# Patient Record
Sex: MALE | Race: WHITE | Employment: UNEMPLOYED | URBAN - NONMETROPOLITAN AREA
[De-identification: names, ages, dates, MRNs, and addresses within clinical notes are randomized per-mention and may not be internally consistent; named-entity substitution may affect disease eponyms.]

---

## 2019-01-17 ENCOUNTER — OFFICE VISIT (OUTPATIENT)
Dept: FAMILY MEDICINE | Facility: CLINIC | Age: 49
End: 2019-01-17
Payer: COMMERCIAL

## 2019-01-17 VITALS
WEIGHT: 202 LBS | BODY MASS INDEX: 32.47 KG/M2 | HEIGHT: 66 IN | SYSTOLIC BLOOD PRESSURE: 124 MMHG | TEMPERATURE: 99.4 F | OXYGEN SATURATION: 99 % | DIASTOLIC BLOOD PRESSURE: 72 MMHG | HEART RATE: 84 BPM

## 2019-01-17 DIAGNOSIS — J02.9 SORE THROAT: Primary | ICD-10-CM

## 2019-01-17 DIAGNOSIS — G44.209 TENSION HEADACHE: ICD-10-CM

## 2019-01-17 DIAGNOSIS — B37.0 ORAL THRUSH: ICD-10-CM

## 2019-01-17 LAB
DEPRECATED S PYO AG THROAT QL EIA: NORMAL
SPECIMEN SOURCE: NORMAL

## 2019-01-17 PROCEDURE — 87081 CULTURE SCREEN ONLY: CPT | Performed by: NURSE PRACTITIONER

## 2019-01-17 PROCEDURE — 99204 OFFICE O/P NEW MOD 45 MIN: CPT | Performed by: NURSE PRACTITIONER

## 2019-01-17 PROCEDURE — 87880 STREP A ASSAY W/OPTIC: CPT | Performed by: NURSE PRACTITIONER

## 2019-01-17 RX ORDER — NYSTATIN 100000/ML
500000 SUSPENSION, ORAL (FINAL DOSE FORM) ORAL 4 TIMES DAILY
Qty: 140 ML | Refills: 0 | Status: SHIPPED | OUTPATIENT
Start: 2019-01-17 | End: 2019-01-24

## 2019-01-17 ASSESSMENT — MIFFLIN-ST. JEOR: SCORE: 1734.02

## 2019-01-17 NOTE — PATIENT INSTRUCTIONS
1. Sore throat  Acute  - Rapid strep screen  Results for orders placed or performed in visit on 01/17/19   Rapid strep screen   Result Value Ref Range    Specimen Description Throat     Rapid Strep A Screen       NEGATIVE: No Group A streptococcal antigen detected by immunoassay, await culture report.     - Beta strep group A culture  - nystatin (MYCOSTATIN) [id number] UNIT/ML suspension; Take 5 mLs (500,000 Units) by mouth 4 times daily for 7 days  Dispense: 140 mL; Refill: 0    2. Tension headache  Acute, stable  Follow the headache guide and keep a journal  May use Excedrin, glass of water, massage, rest, ice, sleep    3. Oral thrush  Acute, stable  - nystatin (MYCOSTATIN) [id number] UNIT/ML suspension; Take 5 mLs (500,000 Units) by mouth 4 times daily for 7 days  Dispense: 140 mL; Refill: 0      Patient Education     Candida Infection: Thrush  Thrush is a fungal infection in the mouth and throat. Thrush does not usually affect healthy adults. It is more common in people with a weak immune system. It is also more likely if you take antibiotics. Thrush is normally not contagious.  Understanding fungus in the mouth and throat  Your mouth and throat normally contain millions of tiny organisms. These include bacteria and yeasts. Many of these do not cause any problems. In fact, they may help fight disease.  Yeasts are a type of fungus. A type of yeast called Candida normally lives on the membranes of your mouth and throat. Usually, this yeast grows only in small amounts and is harmless. But in some cases, Candida can grow out of control and cause thrush. Thrush is related to other kinds of Candida infections that can grow all over the body. Thrush refers to an infection of only the mouth and throat.  What causes thrush?  Thrush happens when something lets too much Candida grow inside your mouth and throat. Certain things that change the normal balance of organisms in the mouth can lead to thrush. One example is antibiotic  medicine. This medicine may kill some of the normal bacteria in your mouth. Candida can then grow freely. People on antibiotics have an increased risk for thrush.  You have a higher risk for thrush if you:    Wear dentures    Are getting chemotherapy    Are getting radiation therapy    Have diabetes    Have a transplanted organ    Use corticosteroids, including inhaled corticosteroids for lung disease    Have a weak immune system, such as from AIDS    Are an older adult  Symptoms of thrush  Symptoms of thrush can include:    A dry, cottony feeling in your mouth    Cracking at the corners of the mouth    Loss of taste    Pain while eating or swallowing    White patches on the tongue and around the sides of the mouth  Diagnosing thrush  Your healthcare provider will ask about your medical history and your symptoms. He or she will look closely at your mouth and throat. White or red patches will be scraped with a tongue depressor. The sample will be sent to a lab to test. This test can usually confirm thrush.  If you have thrush, you may also have esophageal candidiasis. This is common in people who have HIV or a weak immune system. Your healthcare provider may check for this condition with an upper endoscopy. This is a procedure to look at the esophagus. A tissue sample may be taken to test.  Treatment for thrush  Thrush is usually treated with antifungal medicine. The medicine is put directly in your mouth and throat. You may be given a  swish and swallow  medicine or an antifungal lozenge.  In some cases, you may need an antifungal pill. This can remove Candida throughout your body. Or you may need medicine through an intravenous line ( IV). These treatments depend on how severe your infection is, and what other health conditions you have.  If you are at high risk for thrush, you may need to keep taking oral antifungal medicine. This is to help prevent thrush in the future.  What happens if you don t get treated for  thrush?  If untreated, the Candida may spread throughout your body. They may even enter your bloodstream. This can cause serious problems, such as organ failure and even death. Bloodstream infection may need to be treated with high doses of antifungal medicine through an IV.  Systemic infection is much more likely in people who are very ill. It is also more common in those who have serious problems with their immune system. Additional risk factors for systemic infection in very ill people include:    Central venous lines    IV nutrition    Use of broad-spectrum antibiotics    Kidney failure    Recent surgery  Preventing thrush  You may be able to help prevent some cases of thrush. Make sure to:    Practice good oral hygiene. Try using a chlorhexidine mouthwash.    Clean your dentures regularly as instructed. Make sure they fit you correctly.    After using a corticosteroid inhaler, rinse out your mouth with water or mouthwash.    Do not use broad-spectrum antibiotics, if possible.    Get treated for health problems that increase your risk for thrush, such as diabetes.     When to call the healthcare provider  Call your healthcare provider right away if you have any of these:    Cottony feeling in your mouth    Loss of taste    Pain while eating or swallowing    White patches or plaques on your tongue or inside your mouth   Date Last Reviewed: 5/1/2017 2000-2018 [employer]. [address] [place] [address], [place] [address]. All rights reserved. This information is not intended as a substitute for professional medical care. Always follow your healthcare professional's instructions.

## 2019-01-17 NOTE — NURSING NOTE
"Chief Complaint   Patient presents with     Pharyngitis       Initial /72 (BP Location: Right arm, Patient Position: Sitting, Cuff Size: Adult Large)   Pulse 84   Temp 99.4  F (37.4  C) (Tympanic)   Ht 1.676 m (5' 6\")   Wt 91.6 kg (202 lb)   SpO2 99%   BMI 32.60 kg/m   Estimated body mass index is 32.6 kg/m  as calculated from the following:    Height as of this encounter: 1.676 m (5' 6\").    Weight as of this encounter: 91.6 kg (202 lb).    Patient presents to the clinic using No DME    Health Maintenance that is potentially due pending provider review:  NONE    n/a    Is there anyone who you would like to be able to receive your results? No  If yes have patient fill out GAL[facility]    "

## 2019-01-17 NOTE — PROGRESS NOTES
SUBJECTIVE:   Carmelo Henderson is a 48 year old male who presents to clinic today for the following health issues:    Leonarda- fiancee    ENT Symptoms             Symptoms: cc Present Absent Comment   Fever/Chills  x  100.4 to 101.8   Fatigue  x     Muscle Aches  x     Eye Irritation   x    Sneezing   x    Nasal Jere/Drg   x    Sinus Pressure/Pain   x    Loss of smell   x    Dental pain   x    Sore Throat  x     Swollen Glands  x     Ear Pain/Fullness   x    Cough   x    Wheeze   x    Chest Pain   x    Shortness of breath   x    Rash   x    Other         Symptom duration:  2 days    Symptom severity:  Severe when swallowing    Treatments tried:  Tylenol    Contacts:  Exposed to thrush    Fiancee treated for thrush 2 weeks ago    Headaches    Duration: Years,increasing for 3 months      Description  Location:  unilateral in the left occipital area (start on the back and then come to the top  Glasses- last eye exam 2012  Character: squeezing pain  Frequency:  1-2 times weekly  Duration:  8-10 hours    Intensity:  moderate    Accompanying signs and symptoms:    Precipitating or Alleviating factors:  Nausea/vomiting: the last 3 days   Dizziness: no  Weakness or numbness: no  Visual changes: vision blacks out for 1 second  Fever: YES- the past 3 days   Sinus or URI symptoms no     History  Head trauma: YES- once fell on the ice unsure if he lost consciousness    Family history of migraines: no  Previous tests for headaches: no  Neurologist evaluations: no  Able to do daily activities when headache present: YES- usually   Wake with headaches: YES  Daily pain medication use: no  Any changes in: unemployed during the winter     Precipitating or Alleviating factors (light/sound/sleep/caffeine): no    Therapies tried and outcome: Tylenol or IBU   Outcome - not effective  Frequent/daily pain medication use: no      PCP   [facility] [phone]    Health Maintenance        Health Maintenance Due   Topic Date Due  "    PHQ-2 Q1 YR  08/28/1983     HIV SCREEN (SYSTEM ASSIGNED)  08/28/1989     DTAP/TDAP/TD IMMUNIZATION (1 - Tdap) 08/28/1996     LIPID SCREEN Q5 YR MALE (SYSTEM ASSIGNED)  08/28/2006     INFLUENZA VACCINE (1) 09/01/2018       HPI      There is no problem list on file for this patient.    No current outpatient medications on file.     No current facility-administered medications for this visit.        Reviewed and updated:  Tobacco  Allergies  Meds  Med Hx  Surg Hx  Fam Hx  Soc Hx     ROS:  Constitutional, HEENT, cardiovascular, pulmonary, derm, M/S, Psych, GI and  systems are negative, except as otherwise noted.    PHYSICAL EXAM   /72 (BP Location: Right arm, Patient Position: Sitting, Cuff Size: Adult Large)   Pulse 84   Temp 99.4  F (37.4  C) (Tympanic)   Ht 1.676 m (5' 6\")   Wt 91.6 kg (202 lb)   SpO2 99%   BMI 32.60 kg/m    Body mass index is 32.6 kg/m .  GENERAL APPEARANCE: healthy, alert and no distress  EYES: Eyes grossly normal to inspection, PERRL, conjunctivae and sclerae normal and GLASSES  HENT: ear canals and TM's normal and nose and mouth without ulcers or lesions, posterior oropharynx is erythemic with yellow patches  NECK: no adenopathy, no asymmetry, masses, or scars and thyroid normal to palpation  RESP: lungs clear to auscultation - no rales, rhonchi or wheezes  CV: regular rates and rhythm, normal S1 S2, no S3 or S4 and no murmur, click or rub  MS: extremities normal- no gross deformities noted  PSYCH: mentation appears normal and affect normal/bright      ASSESSMENT & PLAN   Time spent: 30 minutes  with patient; greater than one half devoted to coordination of care for diagnosis and plan above.     1. Sore throat  Acute  - Rapid strep screen  Results for orders placed or performed in visit on 01/17/19   Rapid strep screen   Result Value Ref Range    Specimen Description Throat     Rapid Strep A Screen       NEGATIVE: No Group A streptococcal antigen detected by immunoassay, " await culture report.     - Beta strep group A culture  - nystatin (MYCOSTATIN) [id number] UNIT/ML suspension; Take 5 mLs (500,000 Units) by mouth 4 times daily for 7 days  Dispense: 140 mL; Refill: 0    2. Tension headache  Acute, stable  Follow the headache guide and keep a journal  May use Excedrin, glass of water, massage, rest, ice, sleep    3. Oral thrush  Acute, stable  - nystatin (MYCOSTATIN) [id number] UNIT/ML suspension; Take 5 mLs (500,000 Units) by mouth 4 times daily for 7 days  Dispense: 140 mL; Refill: 0      Patient Education     Candida Infection: Thrush  Thrush is a fungal infection in the mouth and throat. Thrush does not usually affect healthy adults. It is more common in people with a weak immune system. It is also more likely if you take antibiotics. Thrush is normally not contagious.  Understanding fungus in the mouth and throat  Your mouth and throat normally contain millions of tiny organisms. These include bacteria and yeasts. Many of these do not cause any problems. In fact, they may help fight disease.  Yeasts are a type of fungus. A type of yeast called Candida normally lives on the membranes of your mouth and throat. Usually, this yeast grows only in small amounts and is harmless. But in some cases, Candida can grow out of control and cause thrush. Thrush is related to other kinds of Candida infections that can grow all over the body. Thrush refers to an infection of only the mouth and throat.  What causes thrush?  Thrush happens when something lets too much Candida grow inside your mouth and throat. Certain things that change the normal balance of organisms in the mouth can lead to thrush. One example is antibiotic medicine. This medicine may kill some of the normal bacteria in your mouth. Candida can then grow freely. People on antibiotics have an increased risk for thrush.  You have a higher risk for thrush if you:    Wear dentures    Are getting chemotherapy    Are getting radiation  therapy    Have diabetes    Have a transplanted organ    Use corticosteroids, including inhaled corticosteroids for lung disease    Have a weak immune system, such as from AIDS    Are an older adult  Symptoms of thrush  Symptoms of thrush can include:    A dry, cottony feeling in your mouth    Cracking at the corners of the mouth    Loss of taste    Pain while eating or swallowing    White patches on the tongue and around the sides of the mouth  Diagnosing thrush  Your healthcare provider will ask about your medical history and your symptoms. He or she will look closely at your mouth and throat. White or red patches will be scraped with a tongue depressor. The sample will be sent to a lab to test. This test can usually confirm thrush.  If you have thrush, you may also have esophageal candidiasis. This is common in people who have HIV or a weak immune system. Your healthcare provider may check for this condition with an upper endoscopy. This is a procedure to look at the esophagus. A tissue sample may be taken to test.  Treatment for thrush  Thrush is usually treated with antifungal medicine. The medicine is put directly in your mouth and throat. You may be given a  swish and swallow  medicine or an antifungal lozenge.  In some cases, you may need an antifungal pill. This can remove Candida throughout your body. Or you may need medicine through an intravenous line ( IV). These treatments depend on how severe your infection is, and what other health conditions you have.  If you are at high risk for thrush, you may need to keep taking oral antifungal medicine. This is to help prevent thrush in the future.  What happens if you don t get treated for thrush?  If untreated, the Candida may spread throughout your body. They may even enter your bloodstream. This can cause serious problems, such as organ failure and even death. Bloodstream infection may need to be treated with high doses of antifungal medicine through an  IV.  Systemic infection is much more likely in people who are very ill. It is also more common in those who have serious problems with their immune system. Additional risk factors for systemic infection in very ill people include:    Central venous lines    IV nutrition    Use of broad-spectrum antibiotics    Kidney failure    Recent surgery  Preventing thrush  You may be able to help prevent some cases of thrush. Make sure to:    Practice good oral hygiene. Try using a chlorhexidine mouthwash.    Clean your dentures regularly as instructed. Make sure they fit you correctly.    After using a corticosteroid inhaler, rinse out your mouth with water or mouthwash.    Do not use broad-spectrum antibiotics, if possible.    Get treated for health problems that increase your risk for thrush, such as diabetes.     When to call the healthcare provider  Call your healthcare provider right away if you have any of these:    Cottony feeling in your mouth    Loss of taste    Pain while eating or swallowing    White patches or plaques on your tongue or inside your mouth   Date Last Reviewed: 5/1/2017 2000-2018 [employer]. [address] [place] [address]. All rights reserved. This information is not intended as a substitute for professional medical care. Always follow your healthcare professional's instructions.             Risks, benefits, side effects and rationale for treatment plan fully discussed with the patient and understanding expressed.    REYNA Gant-BC  [facility]

## 2019-01-18 LAB
BACTERIA SPEC CULT: NORMAL
SPECIMEN SOURCE: NORMAL

## 2019-01-18 NOTE — RESULT ENCOUNTER NOTE
Final Beta strep group A r/o culture is NEGATIVE for Group A streptococcus.    No treatment or change in treatment per [facility] Strep protocol.    Please notify him of these results and send a hard copy if not on myChart.   Thanks. REYNA Fernandez

## 2020-03-27 ENCOUNTER — TELEPHONE (OUTPATIENT)
Dept: FAMILY MEDICINE | Facility: CLINIC | Age: 50
End: 2020-03-27

## 2020-03-27 PROBLEM — Z72.0 TOBACCO ABUSE DISORDER: Status: ACTIVE | Noted: 2020-03-27

## 2020-03-27 NOTE — TELEPHONE ENCOUNTER
Reason for Call:  Other call back    Detailed comments: Cindy Joe is calling for Carmelo and roxanna Booker know as requested that for smoking cessation for him he  would like the presription of chanix.   Phar is [facility] in [place]    Phone Number Patient can be reached at: Other phone number:  [phone]    Best Time: any    Can we leave a detailed message on this number? YES    Call taken on 3/27/2020 at 1:56 PM by Terra Castro

## 2020-03-28 NOTE — PROGRESS NOTES
"SUBJECTIVE   Carmelo Henderson is a 49 year old male who is being evaluated via a  billable telephone visit with complaint of  Smoking Cessation      The patient has been notified of following:   \"This telephone visit will be conducted via a call between you and your physician/provider. We have found that certain health care needs can be provided without the need for a physical exam.  This service lets us provide the care you need with a short phone conversation.  If a prescription is necessary we can send it directly to your pharmacy.  If lab work is needed we can place an order for that and you can then stop by our lab to have the test done at a later time.    If during the course of the call the physician/provider feels a telephone visit is not appropriate, you will not be charged for this service.\"   Heidy Booker CNP on 4/3/2020 at 10:36 AM   (MA signature)    Why do you want to quit? (health/social/financial): health and financial   How much do you smoke?: half a pack or 3/4 pack   How many years have you smoked?: 20   Have you ever tried quitting before, and how? (patch, gum, ecigarette, pills): tried quitting cold turkey and did not work for him   Where do you typically smoke? (car, bar, home, outside): outside, car   When is your first morning cigarette?: 20-30 minutes after waking up and getting coffee  Household smokers # ? 1, his wife   History of seizures? no   Psychiatric drugs? : no   Symptoms of depression: no   Employment: not at the moment   What potential triggers might impede your goal? (friends, daily activity): doesn't drink often but that makes him smoke more, its more of a routine     Nicotine inhaler first choice, gum second choice        PCP   [facility] [phone]    Patient Active Problem List   Diagnosis     Nicotine dependence, uncomplicated, unspecified nicotine product type     Current Outpatient Medications   Medication     nicotine (NICOTROL) 10 MG inhaler "     [START ON 5/3/2020] varenicline (CHANTIX CONTINUING MONTH WALLACE) 1 MG tablet     varenicline (CHANTIX STARTING MONTH WALLACE) 0.5 MG X 11 & 1 MG X 42 tablet     No current facility-administered medications for this visit.        ALLERGIES:  Patient has no known allergies.    Reviewed and updated as needed this visit by Provider:  Tobacco  Allergies  Meds  Med Hx  Surg Hx  Fam Hx  Soc Hx     ASSESSMENT & PLAN     1. Encounter for smoking cessation counseling  - SMOKING CESSATION COUNSELING 3-10 MIN  - varenicline (CHANTIX STARTING MONTH WALLACE) 0.5 MG X 11 & 1 MG X 42 tablet; Continue therapy for 3 months.  Dispense: 53 tablet; Refill: 0  - varenicline (CHANTIX CONTINUING MONTH WALLACE) 1 MG tablet; Take 1 tablet (1 mg) by mouth 2 times daily  Dispense: 60 tablet; Refill: 1  - nicotine (NICOTROL) 10 MG inhaler; Use 1 cartridge as needed for urge to smoke by puffing over course of 20min.  Use 6-16 cart/day; reduce number of cart/day over 6-12 weeks.  Dispense: 180 Box; Refill: 1    2. Nicotine dependence, uncomplicated, unspecified nicotine product type  Chronic, stable  - SMOKING CESSATION COUNSELING 3-10 MIN  - varenicline (CHANTIX STARTING MONTH WALLACE) 0.5 MG X 11 & 1 MG X 42 tablet; Continue therapy for 3 months.  Dispense: 53 tablet; Refill: 0  - varenicline (CHANTIX CONTINUING MONTH WALLACE) 1 MG tablet; Take 1 tablet (1 mg) by mouth 2 times daily  Dispense: 60 tablet; Refill: 1  - nicotine (NICOTROL) 10 MG inhaler; Use 1 cartridge as needed for urge to smoke by puffing over course of 20min.  Use 6-16 cart/day; reduce number of cart/day over 6-12 weeks.  Dispense: 180 Box; Refill: 1      Varenicline Tartrate (Chantix):  Chantix tablets Days                Dosage  0.5 mg                     1 through 3 0.5 mg once daily  0.5 mg                      4 through 7 0.5 mg twice daily  1.0 mg                     Day 8 to end  1 mg twice daily    Renal dosing:  For severe renal impairment, start at a dose of 0.5 mg daily and  "then titrate as needed to a maximum dose of 0.5 mg twice daily.  For patients with end-stage renal disease (ESRD) undergoing hemodialysis, a maximum dose of 0.5 mg daily may be administered if tolerated well.    How to take:  You will start taking Chantix one week before your quit date while you are still smoking. At first you will use the Chantix starter pack. Once you are finished with that, you will be on a \"maintenance dose\" that will probably stay the same for the rest of your treatment.  After a week of slowly increasing your dose, you will take Chantix twice a day. Take each dose after eating and with a full glass of water. Taking Chantix with food and water decreases nausea. That is also why you begin on a lower dose and slowly increase it. Once you begin the twice a day dosage, your 2 doses should be at least 8-10 hours apart and at least 4-5 hours before bedtime.      Duration of therapy: 12 weeks.  An additional course of 12 weeks of treatment is recommended if the first 12 weeks were successful to improve long term abstinence.    Adverse reactions:  The most common adverse reaction associated with varenicline treatment is nausea. Other common adverse effects include sleep disturbance, constipation, flatulence and vomiting.    There have been warnings from the FDA to be on the lookout for erratic behavior, suicidal ideation, or suicidal behavior.   There is one case report of a death, though it appears alcohol consumption may have played a part in that case.    Please report any behavior or mood changes as well as being aware of drowsiness.   Be cautious when operating motor vehicles or dangerous machinery until the medication's effect on you is clear.    Nicotine Inhaler    Dosing:    Weeks 1-12 Use 6-16 cartridges per day. Maximum of 16 cartridges per day.   Weeks 12-24 Gradually reduce the number of cartridges per day.     How to use the nicotine Inhaler:    Remove the mouthpiece from the plastic wrap " "and push the top and bottom pieces together. Turn them to line up the markings and pull the top and bottom apart.     Insert one cartridge into the inhaler. Push hard until it pops into place.    Line up the markings again and push the top and bottom back together.    Turn the pieces so the markings do not line up and the inhaler is \"locked\".    Inhale deeply into back of throat or puff in short breaths.    You will soon learn to be able to tell when no nicotine is left in the cartridge. Once you feel you are no longer getting any nicotine, change the cartridge.    Take off the top of the mouthpiece and throw away the cartridge.    Especially at the beginning, use the inhaler whenever you would normally smoke a cigarette.    Some tips:    Do not smoke while you are using the nicotine inhaler.    Each cartridge will last for 20 minutes of puffing.    Puff on the inhaler until your craving resolves. If used for less than 20 minutes, leave the cartridge in the inhaler and save for your next use.     Try different schedules to see what works best for you--try puffing for a full 20 minutes, or try puffing for 5 minutes at a time. The cartridge will last for FOUR 5 minute sessions.    Keep inhaler and cartridges out of reach of children.    Store the inhaler at room temperature.    Clean the mouthpiece regularly with soap and water.     As your body becomes less dependent on nicotine, you will need to use less cartridges.    Follow up with your health care professional about any questions you have and to get help with tapering you dose of the nicotine inhaler.    Side Effects:  Some people experience mild irritation of the mouth and throat in the first few days. These should resolve with time. If you experience any other troublesome or unusual side effects, call your health care professional.    Kicking the Smoking Habit  If you smoke, quitting is one of the best changes you can make for your heart and your overall " health. Your risk of heart attack goes down within one day of putting out that last cigarette. As you go longer without smoking, your risk goes down even more. Quitting isn t easy, but millions of people have done it. You can, too. It s never too late to quit.    Getting started  Boost your chances of success by deciding on your  quit plan.  Your health care provider and cardiac rehab team can help you develop this plan. Even if you ve already quit, it s easy to slip back into smoking.  Your plan can help you avoid and recover from relapse.  In any case, start by setting a date to quit within a month, and do it.    Keys to your quit plan    Talk to your healthcare provider about prescription medicines and nicotine replacement products that help stop the urge to smoke.     Join a support group or quit smoking program. Talking with others about the challenges of quitting can help you get through them.    Ask other smokers in your household to quit with you.    Look for the cues in your life that you associate with smoking and avoid them.    Track your triggers  What gives you that  N-lrut-u-cigarette  feeling? List all the situations that make you want a cigarette. Then think of other ways to deal with these situations. Here are some examples:  Situation How I'll handle it   Finishing a meal Get up from the table and take a walk   Having an argument Find a quiet place and breathe deeply   Feeling lonely or bored Call a friend to talk      Tips for quitting successfully    List the benefits of quitting such as reducing heart risks and saving money. Keep this list and review it whenever you feel like smoking.    Get support. Let your friends know you may call them to chat when you have an urge to smoke.    If you ve tried to quit before without success, this time avoid the triggers that may cause the relapse.    Make the most of slip-ups. Try to learn from them, and then get back on track.    Be accountable to your  friends and your calendar so that you stay on track.  For family and friends    Be supportive and patient. Quitting smoking can be difficult and stressful.    If you smoke, now s a great time to quit. Even if you don t quit, never smoke around your loved one. Secondhand smoke is dangerous to his or her heart.    The best goals are accomplished in teams. Remember that when your loved one states he or she wants to stop smoking.  Date Last Reviewed: 7/1/2016 2000-2019 [employer]. [address] [place] [address], [place] [address]. All rights reserved. This information is not intended as a substitute for professional medical care. Always follow your healthcare professional's instructions.         How to Quit Smoking  Smoking is a hard habit to break. About half of all people who have ever smoked have been able to quit. Most people who still smoke want to quit. Here are some of the best ways to stop smoking.    Keep in mind the health benefits of quitting  The health benefits of quitting start right away. They keep improving the longer you go without smoking. Knowing this can help inspire you to stay on track. These benefits occur at any age. If you are 17 or 70, quitting is a good choice. Some of the health benefits after your last cigarette include:    20 minutes: Your blood pressure and pulse return to normal.    8 hours: Your oxygen levels return to normal.    2 days: Your ability to smell and taste start to improve as damaged nerves regrow.    2 to 3 weeks: Your circulation and lung function improve.    1 to 9 months: Your coughing, congestion, and shortness of breath decrease. Your tiredness decreases.    1 year: Your risk of heart attack decreases by half.    5 years: Your risk of lung cancer decreases by half. Your risk of stroke becomes the same as a nonsmoker s.  Go cold turkey  Most former smokers quit cold turkey. This means stopping all at once. Trying to cut back slowly often doesn't work as well.  "This may be because it continues the habit of smoking. Also, you may inhale more smoke while smoking fewer cigarettes. This leads to the same amount of nicotine in your body.  Get support  Support programs can be a big help, especially for heavy smokers. These groups offer lectures, ways to change behavior, and peer support. Here are some ways to find a support program:    Free national quitline 800-QUIT-NOW (959-377-6656)    [facility] quit-smoking programs    American Lung Association 927-119-5035    American Cancer Society [phone]  Support at home is important too. Family and friends can offer praise and reassurance. If the smoker in your life finds it hard to quit, encourage them to keep trying.  Try over-the-counter medicine  Nicotine replacement therapy may make it easier to quit. Some aids are available without a prescription. These include a nicotine patch, gum, and lozenges. But it is best to use these under the care of your healthcare provider. The skin patch gives a steady supply of nicotine. Nicotine gum and lozenges give short-time doses of low levels of nicotine. Both methods reduce the craving for cigarettes. If you have nausea, vomiting, dizziness, weakness, or a fast heartbeat, stop using these products. See your healthcare provider.  Ask about prescription medicine  After reviewing your smoking patterns and past attempts to quit, your doctor may offer a prescription medicine such as bupropion, varenicline, a nicotine inhaler, or nasal spray. Each has advantages and side effects. Your doctor can review these with you.  Keep trying  Most smokers make many attempts at quitting before they are successful. It s important not to give up.  For more information  For more on how to quit smoking, try these online resources:     Go to Smokefree.gov.    Read \"Clearing the Air\" from the National Cancer [place] at smokefree.gov/sites/default/files/pdf/clearing-the-air-accessible.pdf.  Date Last Reviewed: "     0221-2810 [employer]. [address] [place] [address], [place] [address]. All rights reserved. This information is not intended as a substitute for professional medical care. Always follow your healthcare professional's instructions.         Coping with Smoking Withdrawal  For the first few days after you quit smoking, you may feel cranky, restless, depressed, or low on energy. These are symptoms of withdrawal. Your body needs time to recover from smoking. Your symptoms should lessen within a few days.    Coping with the urge to smoke    Deep-breathe. Breathe in through your nose. Count to 5. Slowly breathe out through your mouth.    Drink water. Try to drink 8 or more 8-ounce glasses of water a day.    Keep your hands busy. Wash your car. Draw. Do a puzzle. Build a [employer].    Delay. The urge to smoke lasts only 3 to 5 minutes.    Keep your mouth busy. Try chewing on fruits or vegetables such as celery, carrots, or apples. Chew sugarless gum or suck on sugar-free hard candy.    Get support  Individual, group, and phone counseling can help keep you on track. Ask your healthcare provider for more information about resources available to you.  Control stress  After you quit, you may feel irritable and stressed. Try taking a warm bath or shower. Listen to music. Go for a walk or to the gym. Try yoga or meditate. Call friends or talk with a professional.  Exercise  Exercise helps your body and mind feel better. There are many ways to be more active. Find something you enjoy doing. See if a friend will join you for a walk or a bike ride.  Sleep better  You may feel tired but have trouble falling asleep. Try to relax before bed. Do a few stretching exercises. Read for a while. Also don't have caffeine for at least a few hours before bedtime.  Get fit, not fat  You may notice an increased appetite. Many people who quit smoking gain a few pounds. To limit weight gain, try to watch what you eat. Cut back on fat  in your diet. Snack on low-calorie foods such as fresh fruits and vegetables. Drink low-calorie liquids, especially water. Regular exercise can also help you stay fit. And remember: Your main goal is to be a nonsmoker. Stay focused on that goal.  Quit-smoking products  There are many products that can help you quit smoking. These include medicines and nicotine replacement products. They are available over-the-counter or by prescription. Ask your healthcare provider if any of these could help you quit smoking.      Date Last Reviewed: 4/1/2019 2000-2019 [employer]. [address] [place] [address]. All rights reserved. This information is not intended as a substitute for professional medical care. Always follow your healthcare professional's instructions.    Preventing weight gain after you stop smoking    Smoking acts as an appetite suppressant, so gaining weight is a common concern for many of us when we decide to give up cigarettes. You may even be using it as a reason not to quit. While it s true that many smokers put on weight within six months of stopping smoking, the gain is usually small--about five pounds on average--and that initial gain decreases over time. It s also important to remember that carrying a few extra pounds for a few months won t hurt your heart as much as smoking does. However, gaining weight is NOT inevitable when you stop smoking.    Smoking dampens your sense of smell and taste, so after you quit food will often seem more appealing. You may also gain weight if you replace the oral gratification of smoking with eating unhealthy comfort foods. Therefore, it s important to find other, healthy ways to deal with unpleasant feelings such as stress, anxiety, or boredom rather than mindless, emotional eating.    Nurture yourself. Instead of turning to cigarettes or food when you feel stressed, anxious, or depressed, learn new ways to quickly soothe yourself. Listen to  uplifting music, play with a pet, or sip a cup of hot tea, for example.    Eat healthy, varied meals. Eat plenty of fruit, vegetables, and healthy fats. Avoid sugary food, sodas, fried, and convenience food.    Learn to eat mindfully. Emotional eating tends to be automatic and virtually mindless. It s easy to polish off a tub of ice cream while zoning out in front of the TV or staring at your phone. But by removing distractions when you eat, it s easier to focus on how much you re eating and tune into your body and how you re really feeling. Are you really still hungry or eating for another reason?    Drink lots of water. Drinking at least six to eight 8 oz. glasses will help you feel full and keep you from eating when you re not hungry. Water will also help flush toxins from your body.    Take a walk. Not only will it help you burn calories and keep the weight off, but it will also help alleviate feelings of stress and frustration that accompany smoking withdrawal.    Snack on guilt-free foods. Good choices include sugar-free gum, carrot and celery sticks, or sliced bell peppers or jicama.    Where can I get more information about quitting?      For information and referrals to smoking cessation programs in you area, call:    [place] Tobacco Quit Line - 4-204-370-STOP (1-437.377.8780)    American Cancer Society- 1-591-JCN-2345 ( 1-102.940.2602)    American Lung Association - 1-800-LUNG-[place] (1-475.665.1337)    National Cancer [place] - 2-749-8-CANCER   (1-585.639.3046)  Websites:    Smokefree.gov    www.quit.com    FreedomFromSmoking.org    www.helpguide.org    QUITPLAN: 9-936-752-PLAN (7367),  http://quitplan.quitnet.com/p/quitplan/triage.jtml  Everyone in [place] has access to free telephone helpline support to quit tobacco either through their health plan or through QUITPLAN Services. The QUITPLAN web program is free to everyone regardless of coverage.   The QUITPLAN Helpline, including gum, patches,  lozenges, is free to the uninsured and those without coverage.           Risks, benefits, side effects and rationale for treatment plan fully discussed with the patient and understanding expressed. I have reviewed the note as documented above.  This accurately captures the substance of my conversation with the patient.  REYNA Gant-BC    Total time of call between patient and provider was 10 minutes.

## 2020-04-03 ENCOUNTER — VIRTUAL VISIT (OUTPATIENT)
Dept: FAMILY MEDICINE | Facility: CLINIC | Age: 50
End: 2020-04-03
Payer: COMMERCIAL

## 2020-04-03 DIAGNOSIS — Z71.6 ENCOUNTER FOR SMOKING CESSATION COUNSELING: Primary | ICD-10-CM

## 2020-04-03 DIAGNOSIS — F17.200 NICOTINE DEPENDENCE, UNCOMPLICATED, UNSPECIFIED NICOTINE PRODUCT TYPE: ICD-10-CM

## 2020-04-03 PROBLEM — Z72.0 TOBACCO ABUSE DISORDER: Status: RESOLVED | Noted: 2020-03-27 | Resolved: 2020-04-03

## 2020-04-03 PROCEDURE — 99441 ZZC PHYSICIAN TELEPHONE EVALUATION 5-10 MIN: CPT | Performed by: NURSE PRACTITIONER

## 2020-04-03 RX ORDER — VARENICLINE TARTRATE 1 MG/1
1 TABLET, FILM COATED ORAL 2 TIMES DAILY
Qty: 60 TABLET | Refills: 1 | Status: SHIPPED | OUTPATIENT
Start: 2020-05-03 | End: 2020-09-21

## 2020-04-03 NOTE — PATIENT INSTRUCTIONS
1. Encounter for smoking cessation counseling  - SMOKING CESSATION COUNSELING 3-10 MIN  - varenicline (CHANTIX STARTING MONTH WALLACE) 0.5 MG X 11 & 1 MG X 42 tablet; Continue therapy for 3 months.  Dispense: 53 tablet; Refill: 0  - varenicline (CHANTIX CONTINUING MONTH WALLACE) 1 MG tablet; Take 1 tablet (1 mg) by mouth 2 times daily  Dispense: 60 tablet; Refill: 1  - nicotine (NICOTROL) 10 MG inhaler; Use 1 cartridge as needed for urge to smoke by puffing over course of 20min.  Use 6-16 cart/day; reduce number of cart/day over 6-12 weeks.  Dispense: 180 Box; Refill: 1    2. Nicotine dependence, uncomplicated, unspecified nicotine product type  Chronic, stable  - SMOKING CESSATION COUNSELING 3-10 MIN  - varenicline (CHANTIX STARTING MONTH WALLACE) 0.5 MG X 11 & 1 MG X 42 tablet; Continue therapy for 3 months.  Dispense: 53 tablet; Refill: 0  - varenicline (CHANTIX CONTINUING MONTH WALLACE) 1 MG tablet; Take 1 tablet (1 mg) by mouth 2 times daily  Dispense: 60 tablet; Refill: 1  - nicotine (NICOTROL) 10 MG inhaler; Use 1 cartridge as needed for urge to smoke by puffing over course of 20min.  Use 6-16 cart/day; reduce number of cart/day over 6-12 weeks.  Dispense: 180 Box; Refill: 1      Varenicline Tartrate (Chantix):  Chantix tablets Days                Dosage  0.5 mg                     1 through 3 0.5 mg once daily  0.5 mg                      4 through 7 0.5 mg twice daily  1.0 mg                     Day 8 to end  1 mg twice daily    Renal dosing:  For severe renal impairment, start at a dose of 0.5 mg daily and then titrate as needed to a maximum dose of 0.5 mg twice daily.  For patients with end-stage renal disease (ESRD) undergoing hemodialysis, a maximum dose of 0.5 mg daily may be administered if tolerated well.    How to take:  You will start taking Chantix one week before your quit date while you are still smoking. At first you will use the Chantix starter pack. Once you are finished with that, you will be on a  "\"maintenance dose\" that will probably stay the same for the rest of your treatment.  After a week of slowly increasing your dose, you will take Chantix twice a day. Take each dose after eating and with a full glass of water. Taking Chantix with food and water decreases nausea. That is also why you begin on a lower dose and slowly increase it. Once you begin the twice a day dosage, your 2 doses should be at least 8-10 hours apart and at least 4-5 hours before bedtime.      Duration of therapy: 12 weeks.  An additional course of 12 weeks of treatment is recommended if the first 12 weeks were successful to improve long term abstinence.    Adverse reactions:  The most common adverse reaction associated with varenicline treatment is nausea. Other common adverse effects include sleep disturbance, constipation, flatulence and vomiting.    There have been warnings from the FDA to be on the lookout for erratic behavior, suicidal ideation, or suicidal behavior.   There is one case report of a death, though it appears alcohol consumption may have played a part in that case.    Please report any behavior or mood changes as well as being aware of drowsiness.   Be cautious when operating motor vehicles or dangerous machinery until the medication's effect on you is clear.    Nicotine Inhaler    Dosing:    Weeks 1-12 Use 6-16 cartridges per day. Maximum of 16 cartridges per day.   Weeks 12-24 Gradually reduce the number of cartridges per day.     How to use the nicotine Inhaler:    Remove the mouthpiece from the plastic wrap and push the top and bottom pieces together. Turn them to line up the markings and pull the top and bottom apart.     Insert one cartridge into the inhaler. Push hard until it pops into place.    Line up the markings again and push the top and bottom back together.    Turn the pieces so the markings do not line up and the inhaler is \"locked\".    Inhale deeply into back of throat or puff in short breaths.    You " will soon learn to be able to tell when no nicotine is left in the cartridge. Once you feel you are no longer getting any nicotine, change the cartridge.    Take off the top of the mouthpiece and throw away the cartridge.    Especially at the beginning, use the inhaler whenever you would normally smoke a cigarette.    Some tips:    Do not smoke while you are using the nicotine inhaler.    Each cartridge will last for 20 minutes of puffing.    Puff on the inhaler until your craving resolves. If used for less than 20 minutes, leave the cartridge in the inhaler and save for your next use.     Try different schedules to see what works best for you--try puffing for a full 20 minutes, or try puffing for 5 minutes at a time. The cartridge will last for FOUR 5 minute sessions.    Keep inhaler and cartridges out of reach of children.    Store the inhaler at room temperature.    Clean the mouthpiece regularly with soap and water.     As your body becomes less dependent on nicotine, you will need to use less cartridges.    Follow up with your health care professional about any questions you have and to get help with tapering you dose of the nicotine inhaler.    Side Effects:  Some people experience mild irritation of the mouth and throat in the first few days. These should resolve with time. If you experience any other troublesome or unusual side effects, call your health care professional.    Kicking the Smoking Habit  If you smoke, quitting is one of the best changes you can make for your heart and your overall health. Your risk of heart attack goes down within one day of putting out that last cigarette. As you go longer without smoking, your risk goes down even more. Quitting isn t easy, but millions of people have done it. You can, too. It s never too late to quit.    Getting started  Boost your chances of success by deciding on your  quit plan.  Your health care provider and cardiac rehab team can help you develop this  plan. Even if you ve already quit, it s easy to slip back into smoking.  Your plan can help you avoid and recover from relapse.  In any case, start by setting a date to quit within a month, and do it.    Keys to your quit plan    Talk to your healthcare provider about prescription medicines and nicotine replacement products that help stop the urge to smoke.     Join a support group or quit smoking program. Talking with others about the challenges of quitting can help you get through them.    Ask other smokers in your household to quit with you.    Look for the cues in your life that you associate with smoking and avoid them.    Track your triggers  What gives you that  P-ofiy-j-cigarette  feeling? List all the situations that make you want a cigarette. Then think of other ways to deal with these situations. Here are some examples:  Situation How I'll handle it   Finishing a meal Get up from the table and take a walk   Having an argument Find a quiet place and breathe deeply   Feeling lonely or bored Call a friend to talk      Tips for quitting successfully    List the benefits of quitting such as reducing heart risks and saving money. Keep this list and review it whenever you feel like smoking.    Get support. Let your friends know you may call them to chat when you have an urge to smoke.    If you ve tried to quit before without success, this time avoid the triggers that may cause the relapse.    Make the most of slip-ups. Try to learn from them, and then get back on track.    Be accountable to your friends and your calendar so that you stay on track.  For family and friends    Be supportive and patient. Quitting smoking can be difficult and stressful.    If you smoke, now s a great time to quit. Even if you don t quit, never smoke around your loved one. Secondhand smoke is dangerous to his or her heart.    The best goals are accomplished in teams. Remember that when your loved one states he or she wants to stop  smoking.  Date Last Reviewed: 7/1/2016 2000-2019 The [employer]. [address] [facility], [place] [address]. All rights reserved. This information is not intended as a substitute for professional medical care. Always follow your healthcare professional's instructions.         How to Quit Smoking  Smoking is a hard habit to break. About half of all people who have ever smoked have been able to quit. Most people who still smoke want to quit. Here are some of the best ways to stop smoking.    Keep in mind the health benefits of quitting  The health benefits of quitting start right away. They keep improving the longer you go without smoking. Knowing this can help inspire you to stay on track. These benefits occur at any age. If you are 17 or 70, quitting is a good choice. Some of the health benefits after your last cigarette include:    20 minutes: Your blood pressure and pulse return to normal.    8 hours: Your oxygen levels return to normal.    2 days: Your ability to smell and taste start to improve as damaged nerves regrow.    2 to 3 weeks: Your circulation and lung function improve.    1 to 9 months: Your coughing, congestion, and shortness of breath decrease. Your tiredness decreases.    1 year: Your risk of heart attack decreases by half.    5 years: Your risk of lung cancer decreases by half. Your risk of stroke becomes the same as a nonsmoker s.  Go cold turkey  Most former smokers quit cold turkey. This means stopping all at once. Trying to cut back slowly often doesn't work as well. This may be because it continues the habit of smoking. Also, you may inhale more smoke while smoking fewer cigarettes. This leads to the same amount of nicotine in your body.  Get support  Support programs can be a big help, especially for heavy smokers. These groups offer lectures, ways to change behavior, and peer support. Here are some ways to find a support program:    Free national quitline 800-QUIT-NOW  "(727.565.9452)    [employer] quit-smoking programs    American Lung Association 180-725-3976    American Cancer Society 791-038-5016  Support at home is important too. Family and friends can offer praise and reassurance. If the smoker in your life finds it hard to quit, encourage them to keep trying.  Try over-the-counter medicine  Nicotine replacement therapy may make it easier to quit. Some aids are available without a prescription. These include a nicotine patch, gum, and lozenges. But it is best to use these under the care of your healthcare provider. The skin patch gives a steady supply of nicotine. Nicotine gum and lozenges give short-time doses of low levels of nicotine. Both methods reduce the craving for cigarettes. If you have nausea, vomiting, dizziness, weakness, or a fast heartbeat, stop using these products. See your healthcare provider.  Ask about prescription medicine  After reviewing your smoking patterns and past attempts to quit, your doctor may offer a prescription medicine such as bupropion, varenicline, a nicotine inhaler, or nasal spray. Each has advantages and side effects. Your doctor can review these with you.  Keep trying  Most smokers make many attempts at quitting before they are successful. It s important not to give up.  For more information  For more on how to quit smoking, try these online resources:     Go to Smokefree.gov.    Read \"Clearing the Air\" from the National Cancer [place] at smokefree.gov/sites/default/files/pdf/clearing-the-air-accessible.pdf.  Date Last Reviewed:     2413-6746 The [employer]. [address] [place] [address], [place] [address]. All rights reserved. This information is not intended as a substitute for professional medical care. Always follow your healthcare professional's instructions.         Coping with Smoking Withdrawal  For the first few days after you quit smoking, you may feel cranky, restless, depressed, or low on energy. These are symptoms of " withdrawal. Your body needs time to recover from smoking. Your symptoms should lessen within a few days.    Coping with the urge to smoke    Deep-breathe. Breathe in through your nose. Count to 5. Slowly breathe out through your mouth.    Drink water. Try to drink 8 or more 8-ounce glasses of water a day.    Keep your hands busy. Wash your car. Draw. Do a puzzle. Build a [place].    Delay. The urge to smoke lasts only 3 to 5 minutes.    Keep your mouth busy. Try chewing on fruits or vegetables such as celery, carrots, or apples. Chew sugarless gum or suck on sugar-free hard candy.    Get support  Individual, group, and phone counseling can help keep you on track. Ask your healthcare provider for more information about resources available to you.  Control stress  After you quit, you may feel irritable and stressed. Try taking a warm bath or shower. Listen to music. Go for a walk or to the gym. Try yoga or meditate. Call friends or talk with a professional.  Exercise  Exercise helps your body and mind feel better. There are many ways to be more active. Find something you enjoy doing. See if a friend will join you for a walk or a bike ride.  Sleep better  You may feel tired but have trouble falling asleep. Try to relax before bed. Do a few stretching exercises. Read for a while. Also don't have caffeine for at least a few hours before bedtime.  Get fit, not fat  You may notice an increased appetite. Many people who quit smoking gain a few pounds. To limit weight gain, try to watch what you eat. Cut back on fat in your diet. Snack on low-calorie foods such as fresh fruits and vegetables. Drink low-calorie liquids, especially water. Regular exercise can also help you stay fit. And remember: Your main goal is to be a nonsmoker. Stay focused on that goal.  Quit-smoking products  There are many products that can help you quit smoking. These include medicines and nicotine replacement products. They are available  over-the-counter or by prescription. Ask your healthcare provider if any of these could help you quit smoking.      Date Last Reviewed: 4/1/2019 2000-2019 [employer]. [address] [place] [address], [place] [address]. All rights reserved. This information is not intended as a substitute for professional medical care. Always follow your healthcare professional's instructions.    Preventing weight gain after you stop smoking    Smoking acts as an appetite suppressant, so gaining weight is a common concern for many of us when we decide to give up cigarettes. You may even be using it as a reason not to quit. While it s true that many smokers put on weight within six months of stopping smoking, the gain is usually small--about five pounds on average--and that initial gain decreases over time. It s also important to remember that carrying a few extra pounds for a few months won t hurt your heart as much as smoking does. However, gaining weight is NOT inevitable when you stop smoking.    Smoking dampens your sense of smell and taste, so after you quit food will often seem more appealing. You may also gain weight if you replace the oral gratification of smoking with eating unhealthy comfort foods. Therefore, it s important to find other, healthy ways to deal with unpleasant feelings such as stress, anxiety, or boredom rather than mindless, emotional eating.    Nurture yourself. Instead of turning to cigarettes or food when you feel stressed, anxious, or depressed, learn new ways to quickly soothe yourself. Listen to uplifting music, play with a pet, or sip a cup of hot tea, for example.    Eat healthy, varied meals. Eat plenty of fruit, vegetables, and healthy fats. Avoid sugary food, sodas, fried, and convenience food.    Learn to eat mindfully. Emotional eating tends to be automatic and virtually mindless. It s easy to polish off a tub of ice cream while zoning out in front of the TV or staring at your phone.  But by removing distractions when you eat, it s easier to focus on how much you re eating and tune into your body and how you re really feeling. Are you really still hungry or eating for another reason?    Drink lots of water. Drinking at least six to eight 8 oz. glasses will help you feel full and keep you from eating when you re not hungry. Water will also help flush toxins from your body.    Take a walk. Not only will it help you burn calories and keep the weight off, but it will also help alleviate feelings of stress and frustration that accompany smoking withdrawal.    Snack on guilt-free foods. Good choices include sugar-free gum, carrot and celery sticks, or sliced bell peppers or jicama.    Where can I get more information about quitting?      For information and referrals to smoking cessation programs in you area, call:    [place] Tobacco Quit Line - 6-398-299-STOP (1-893.456.4837)    American Cancer Society- 3-732-OEZ-2345 ( 1-854.205.9995)    American Lung Association - 5-800-LUNG-USA (1-334.933.4510)    National Cancer Equality - 4-801-1-CANCER   (1-882.149.5722)  Websites:    Smokefree.gov    www.quit.com    FreedomFromSmoking.org    www.helpguide.org    QUITPLAN: 4-023-084-PLAN (7526),  http://quitplan.quitnet.com/p/quitplan/triage.jtml  Everyone in [place] has access to free telephone helpline support to quit tobacco either through their health plan or through QUITPLAN Services. The QUITPLAN web program is free to everyone regardless of coverage.   The QUITPLAN Helpline, including gum, patches, lozenges, is free to the uninsured and those without coverage.

## 2020-04-13 ENCOUNTER — TELEPHONE (OUTPATIENT)
Dept: FAMILY MEDICINE | Facility: CLINIC | Age: 50
End: 2020-04-13

## 2020-04-13 DIAGNOSIS — F17.200 NICOTINE DEPENDENCE, UNCOMPLICATED, UNSPECIFIED NICOTINE PRODUCT TYPE: Primary | ICD-10-CM

## 2020-04-13 DIAGNOSIS — Z71.6 ENCOUNTER FOR SMOKING CESSATION COUNSELING: ICD-10-CM

## 2020-04-13 NOTE — TELEPHONE ENCOUNTER
Central Prior Authorization Team   Phone: [phone]      PA Initiation    Medication: nicotrol - INITIATED  Insurance Company: [employer] - Phone [phone] Fax [phone]  Pharmacy Filling the Rx: [facility] [address] [place] [address]  Filling Pharmacy Phone: [phone]  Filling Pharmacy Fax: [phone]  Start Date: 4/13/2020

## 2020-04-13 NOTE — TELEPHONE ENCOUNTER
Prior Authorization Retail Medication Request    Medication/Dose: NICOTROL INHALER  Encounter for smoking cessation counseling [Z71.6]  - Primary        Nicotine dependence, uncomplicated, unspecified nicotine product type [F17.200]            ICD code (if different than what is on RX):     Previously Tried and Failed:  Cold turkey  Rationale:       Insurance Name:  BIN [id number] [facility] mcaidmn [phone]  Insurance ID:  [id number]      Pharmacy Information (if different than what is on RX)  Name:   [facility]  Phone:

## 2020-04-14 NOTE — TELEPHONE ENCOUNTER
Boy, Fariba  Dyad 9 Prior Auth 8 minutes ago (1:08 PM)      PA DENIED   Please close encounter when finished. If provider would like to appeal we will need a detailed letter of medical necessity to start the process.   Thank you!   Fariba      Forwarded to Mark Kirk RN

## 2020-04-14 NOTE — TELEPHONE ENCOUNTER
Central Prior Authorization Team   Phone: [phone]      PRIOR AUTHORIZATION DENIED    Medication: nicotrol - DENIED    Denial Date: 4/14/2020    Denial Rational: Pt must try and fail two preferred drugs:    Appeal Information:

## 2020-04-14 NOTE — TELEPHONE ENCOUNTER
Per his insurance, we can try gum. That was his second choice. Please notify patient gum was sent to pharmacy.   REYNA Fernandez

## 2020-09-21 ENCOUNTER — OFFICE VISIT (OUTPATIENT)
Dept: FAMILY MEDICINE | Facility: CLINIC | Age: 50
End: 2020-09-21
Payer: COMMERCIAL

## 2020-09-21 VITALS
HEART RATE: 64 BPM | HEIGHT: 66 IN | RESPIRATION RATE: 18 BRPM | DIASTOLIC BLOOD PRESSURE: 82 MMHG | BODY MASS INDEX: 34.55 KG/M2 | SYSTOLIC BLOOD PRESSURE: 116 MMHG | TEMPERATURE: 98 F | WEIGHT: 215 LBS

## 2020-09-21 DIAGNOSIS — M25.512 ACUTE PAIN OF LEFT SHOULDER: ICD-10-CM

## 2020-09-21 DIAGNOSIS — H61.22 IMPACTED CERUMEN OF LEFT EAR: Primary | ICD-10-CM

## 2020-09-21 PROCEDURE — 69209 REMOVE IMPACTED EAR WAX UNI: CPT | Mod: LT | Performed by: FAMILY MEDICINE

## 2020-09-21 PROCEDURE — 99213 OFFICE O/P EST LOW 20 MIN: CPT | Mod: 25 | Performed by: FAMILY MEDICINE

## 2020-09-21 ASSESSMENT — MIFFLIN-ST. JEOR: SCORE: 1782.98

## 2020-09-21 NOTE — NURSING NOTE
"Chief Complaint   Patient presents with     Ear Problem     /82 (Cuff Size: Adult Large)   Pulse 64   Temp 98  F (36.7  C) (Tympanic)   Resp 18   Ht 1.676 m (5' 6\")   Wt 97.5 kg (215 lb)   BMI 34.70 kg/m   Estimated body mass index is 34.7 kg/m  as calculated from the following:    Height as of this encounter: 1.676 m (5' 6\").    Weight as of this encounter: 97.5 kg (215 lb).  Patient presents to the clinic using No DME      Health Maintenance that is potentially due pending provider review:    Health Maintenance Due   Topic Date Due     PREVENTIVE CARE VISIT  1970     HIV SCREENING  08/28/1986     PNEUMOCOCCAL IMMUNIZATION 19-64 MEDIUM RISK (1 of 1 - PPSV23) 08/28/1990     DTAP/TDAP/TD IMMUNIZATION (1 - Tdap) 08/28/1996     LIPID  08/28/2006     PHQ-2  01/01/2020     INFLUENZA VACCINE (1) 09/01/2020                "

## 2020-09-21 NOTE — PROGRESS NOTES
SUBJECTIVE   Carmelo Henderson is a 50 year old male who presents with     Concern - Ear Problem   Onset: 1.5 weeks   Description: Left ear   Intensity: moderate  Progression of Symptoms:  worsening  Accompanying Signs & Symptoms: Can't hear out of his ear. Will get plugged- can usually get it to unplug in the shower, but this time it wont   Previous history of similar problem: has had issues in the past   Therapies tried and outcome: ear wax drops      Add on: Left shoulder pain for last few days, was doing painting, no fall/trauma or other injury.      PCP   [facility] [phone]    Health Maintenance        Health Maintenance Due   Topic Date Due     PREVENTIVE CARE VISIT  1971     HIV SCREENING  08/28/1986     PNEUMOCOCCAL IMMUNIZATION 19-64 MEDIUM RISK (1 of 1 - PPSV23) 08/28/1990     DTAP/TDAP/TD IMMUNIZATION (1 - Tdap) 08/28/1996     LIPID  08/28/2006     PHQ-2  01/01/2020     INFLUENZA VACCINE (1) 09/01/2020       HPI        Patient Active Problem List   Diagnosis     Nicotine dependence, uncomplicated, unspecified nicotine product type     No current outpatient medications on file.     No current facility-administered medications for this visit.        Patient Active Problem List   Diagnosis     Nicotine dependence, uncomplicated, unspecified nicotine product type     No past surgical history on file.    Social History     Tobacco Use     Smoking status: Current Every Day Smoker     Smokeless tobacco: Never Used   Substance Use Topics     Alcohol use: Yes     Comment: Occ     Family History   Problem Relation Age of Onset     No Known Problems Mother      No Known Problems Father      Diabetes Sister      Diabetes Sister      Hypertension Sister      Cancer Maternal Grandmother         lung CA- smoker     Heart Disease Paternal Grandmother      Diabetes Paternal Great-Grandfather          No current outpatient medications on file.     No Known Allergies  No lab results found.   BP  "Readings from Last 3 Encounters:   09/21/20 116/82   01/17/19 124/72    Wt Readings from Last 3 Encounters:   09/21/20 97.5 kg (215 lb)   01/17/19 91.6 kg (202 lb)                    Reviewed and updated:  Tobacco  Allergies  Meds  Med Hx  Surg Hx  Fam Hx  Soc Hx     ROS:  Constitutional, HEENT, cardiovascular, pulmonary, gi and gu systems are negative, except as otherwise noted.    PHYSICAL EXAM   /82 (Cuff Size: Adult Large)   Pulse 64   Temp 98  F (36.7  C) (Tympanic)   Resp 18   Ht 1.676 m (5' 6\")   Wt 97.5 kg (215 lb)   BMI 34.70 kg/m    Body mass index is 34.7 kg/m .  GENERAL: alert and no distress  EYES: Eyes grossly normal to inspection, PERRL and conjunctivae and sclerae normal  HENT: normal cephalic/atraumatic, right ear: normal: no effusions, no erythema, normal landmarks, left ear: occluded with wax, nose and mouth without ulcers or lesions, oropharynx clear and oral mucous membranes moist  NECK: no adenopathy, no asymmetry, masses, or scars and thyroid normal to palpation  RESP: lungs clear to auscultation - no rales, rhonchi or wheezes  CV: regular rates and rhythm, normal S1 S2, no S3 or S4 and no murmur, click or rub  MS: Slightly limited left shoulder abduction, no joint swelling or skin discoloration noted, normal upper extremity strength, radial pulses 3+, sensation to touch and pressure intact  NEURO: Normal strength and tone, mentation intact and speech normal      Assessment & Plan     Impacted cerumen of left ear  --Left impacted cerumen cleaned with saline irrigation by MA, no complication encountered      (M25.512) Acute pain of left shoulder  --Differentials discussed in detail including left shoulder sprain.  Suggested rest, icing, over-the-counter analgesia.  Will consider imaging if symptoms persist or worsen.  Patient/wife understood and in agreement with above plan.  All questions answered.        Flornecio Reyes MD  [facility]          "

## 2020-10-30 ENCOUNTER — VIRTUAL VISIT (OUTPATIENT)
Dept: FAMILY MEDICINE | Facility: CLINIC | Age: 50
End: 2020-10-30
Payer: COMMERCIAL

## 2020-10-30 DIAGNOSIS — R05.9 COUGH: ICD-10-CM

## 2020-10-30 DIAGNOSIS — Z72.0 TOBACCO ABUSE: ICD-10-CM

## 2020-10-30 DIAGNOSIS — R06.02 SOB (SHORTNESS OF BREATH): ICD-10-CM

## 2020-10-30 DIAGNOSIS — Z20.822 ENCOUNTER FOR LABORATORY TESTING FOR COVID-19 VIRUS: Primary | ICD-10-CM

## 2020-10-30 PROCEDURE — 99214 OFFICE O/P EST MOD 30 MIN: CPT | Mod: 95 | Performed by: PHYSICIAN ASSISTANT

## 2020-10-30 RX ORDER — AMOXICILLIN 500 MG/1
1000 TABLET, FILM COATED ORAL 3 TIMES DAILY
Qty: 42 TABLET | Refills: 0 | Status: SHIPPED | OUTPATIENT
Start: 2020-10-30 | End: 2020-11-06

## 2020-10-30 RX ORDER — AZITHROMYCIN 250 MG/1
TABLET, FILM COATED ORAL
Qty: 6 TABLET | Refills: 0 | Status: SHIPPED | OUTPATIENT
Start: 2020-10-30 | End: 2020-11-04

## 2020-10-30 NOTE — PATIENT INSTRUCTIONS
"Cover with antibiotics for bronchitis v walking pneumonia but suspicious for covid    Given smoking history, suggest being seen for chest xray in a month, regardless of if symptoms completely go away  Discuss flu and pneumonia vaccines at that time, and possibly work on quitting smoking    Covid testing -  You will be called to schedule the covid test, or you can call [phone] to schedule.  Otherwise other testing locations outside [place] can be found at [email or web address]    Discharge Instructions for COVID-19 Patients  You have--or may have--COVID-19. Please follow the instructions listed below.   If you have a weakened immune system, discuss with your doctor any other actions you need to take.  How can I protect others?  If you have symptoms (fever, cough, body aches or trouble breathing):    Stay home and away from others (self-isolate) until:  ? At least 10 days have passed since your symptoms started, And   ? You've had no fever--and no medicine that reduces fever--for 1 full day (24 hours), And    ? Your other symptoms have resolved (gotten better).  If you don't show symptoms, but testing showed that you have COVID-19:    Stay home and away from others (self-isolate). Follow the tips under \"How do I self-isolate?\" below for 10 days (20 days if you have a weak immune system).    You don't need to be retested for COVID-19 before going back to school or work. As long as you're fever-free and feeling better, you can go back to school, work and other activities after waiting the 10 or 20 days.   How do I self-isolate?    Stay in your own room, even for meals. Use your own bathroom if you can.    Stay away from others in your home. No hugging, kissing or shaking hands. No visitors.    Don't go to work, school or anywhere else.    Clean \"high touch\" surfaces often (doorknobs, counters, handles). Use household cleaning spray or wipes. You'll find a full " list of  on the EPA website: www.epa.gov/pesticide-registration/list-n-disinfectants-use-against-sars-cov-2.    Cover your mouth and nose with a mask or other face covering to avoid spreading germs.    Wash your hands and face often. Use soap and water.    Caregivers in these groups are at risk for severe illness due to COVID-19:  ? People 65 years and older  ? People who live in a nursing home or long-term care facility  ? People with chronic disease (lung, heart, cancer, diabetes, kidney, liver, immunologic)  ? People who have a weakened immune system, including those who:    Are in cancer treatment    Take medicine that weakens the immune system, such as corticosteroids    Had a bone marrow or organ transplant    Have an immune deficiency    Have poorly controlled HIV or AIDS    Are obese (body mass index of 40 or higher)    Smoke regularly    Caregivers should wear gloves while washing dishes, handling laundry and cleaning bedrooms and bathrooms.    Use caution when washing and drying laundry: Don't shake dirty laundry and use the warmest water setting that you can.    For more tips on managing your health at home, go to www.cdc.gov/coronavirus/2019-ncov/downloads/10Things.pdf.  How can I take care of myself at home?  1. Get lots of rest. Drink extra fluids (unless a doctor has told you not to).    2. Take Tylenol (acetaminophen) for fever or pain. If you have liver or kidney problems, ask your family doctor if it's okay to take Tylenol.     Adults can take either:  ? 650 mg (two 325 mg pills) every 4 to 6 hours, or   ? 1,000 mg (two 500 mg pills) every 8 hours as needed.  ? Note: Don't take more than 3,000 mg in one day. Acetaminophen is found in many medicines (both prescribed and over-the-counter medicines). Read all labels to be sure you don't take too much.   For children, check the Tylenol bottle for the right dose. The dose is based on the child's age or weight.  3. If you have other health  problems (like cancer, heart failure, an organ transplant or severe kidney disease): Call your specialty clinic if you don't feel better in the next 2 days.    4. Know when to call 911. Emergency warning signs include:  ? Trouble breathing or shortness of breath  ? Pain or pressure in the chest that doesn't go away  ? Feeling confused like you haven't felt before, or not being able to wake up  ? Bluish-colored lips or face    5. Your doctor may have prescribed a blood thinner medicine. Follow their instructions.  Where can I get more information?    [facility] - About COVID-19: [email or web address]    CDC - What to Do If You're Sick: www.cdc.gov/coronavirus/2019-ncov/about/steps-when-sick.html    CDC - Ending Home Isolation: www.cdc.gov/coronavirus/2019-ncov/hcp/disposition-in-home-patients.html    CDC - Caring for Someone: www.cdc.gov/coronavirus/2019-ncov/if-you-are-sick/care-for-someone.html    [facility] - Interim Guidance for Hospital Discharge to Home: www.[facility].[place]/diseases/coronavirus/hcp/hospdischarge.pdf    [facility] clinical trials (COVID-19 research studies): clinicalaffairs.[facility]-clinical-trials    Below are the COVID-19 hotlines at the [place] Department of Health ([facility]). Interpreters are available.  ? For health questions: Call 929-028-0153 or 1-763.868.8710 (7 a.m. to 7 p.m.)  ? For questions about schools and childcare: Call 286-839-7034 or 1-185.858.8628 (7 a.m. to 7 p.m.)    For informational purposes only. Not to replace the advice of your health care provider. Clinically reviewed by the Infection Prevention Team. Copyright   2020 [employer]. All rights reserved. [employer] 913396 - REV 08/04/20.

## 2020-10-30 NOTE — PROGRESS NOTES
"Carmelo Henderson is a 50 year old male who is being evaluated via a billable video visit.      The patient has been notified of following:     \"This video visit will be conducted via a call between you and your physician/provider. We have found that certain health care needs can be provided without the need for an in-person physical exam.  This service lets us provide the care you need with a video conversation.  If a prescription is necessary we can send it directly to your pharmacy.  If lab work is needed we can place an order for that and you can then stop by our lab to have the test done at a later time.    Video visits are billed at different rates depending on your insurance coverage.  Please reach out to your insurance provider with any questions.    If during the course of the call the physician/provider feels a video visit is not appropriate, you will not be charged for this service.\"    Patient has given verbal consent for Video visit? Yes  How would you like to obtain your AVS? Mail a copy  If you are dropped from the video visit, the video invite should be resent to: Text to cell phone: [phone]  Will anyone else be joining your video visit? No    Subjective     Carmelo Henderson is a 50 year old male who presents today via video visit for the following health issues:    HPI     Acute Illness  Acute illness concerns: Cough  Onset/Duration: 2 weeks  Symptoms:  Fever: no  Chills/Sweats: no  Headache (location?): no  Sinus Pressure: no  Conjunctivitis:  no  Ear Pain: no  Rhinorrhea: no  Congestion: no  Sore Throat: no  Cough: YES - Non-productive, becomes short of breath  Wheeze: YES  Decreased Appetite: no  Nausea: no  Vomiting: no  Diarrhea: no  Dysuria/Freq.: no  Dysuria or Hematuria: no  Fatigue/Achiness: YES- Fatigue  Sick/Strep Exposure: YES- Sister-in-law-lives with her, covid test neg, treated for walking pneumonia, sister in law's boyfriend also covid neg, wife healthy   Therapies tried and " outcome: Cough medicine, cough drops, mucinex     Bad cough, x1.5 wks.  Dry cough.  Couple quick coughs.  When talking sometimes has to take a deep breath in order to continue talking.  Not TUCKER with walking across room but with going up the stairs, will have to take a few deep breaths to continue.  Soft wheeze in throat and chest.  Cough varies, sometimes worse and sometimes better, not worsening over the 1.5 wks.  But the SOB started a week ago.  Going to bed 2-2.5 hrs earlier, and more worn out with daily activities.      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  Socialized with 5 people at [employer] last week, and around daughter and her kids - healthy.      Typically healthy kapil, sick once a year.  No history pneumonia or asthma.  Smokes.      Video Start Time: 9:30 AM      Review of Systems   Constitutional, HEENT, cardiovascular, pulmonary, GI, , musculoskeletal, neuro, skin, endocrine and psych systems are negative, except as otherwise noted.      Objective           Vitals:  No vitals were obtained today due to virtual visit.    Physical Exam     GENERAL: Healthy, alert and no distress  EYES: Eyes grossly normal to inspection.  No discharge or erythema, or obvious scleral/conjunctival abnormalities.  RESP: No audible wheeze, cough, or visible cyanosis.  No visible retractions or increased work of breathing.    SKIN: Visible skin clear. No significant rash, abnormal pigmentation or lesions.  NEURO: Cranial nerves grossly intact.  Mentation and speech appropriate for age.  PSYCH: Mentation appears normal, affect normal/bright, judgement and insight intact, normal speech and appearance well-groomed.    No past CXR        Assessment & Plan     Carmelo was seen today for cough.    Diagnoses and all orders for this visit:    Encounter for laboratory testing for COVID-19 virus  -     Symptomatic COVID-19 Virus (Coronavirus) by PCR; Future    Cough  -     Symptomatic COVID-19 Virus  (Coronavirus) by PCR; Future  -     azithromycin (ZITHROMAX) 250 MG tablet; Take 2 tablets (500 mg) by mouth daily for 1 day, THEN 1 tablet (250 mg) daily for 4 days.  -     amoxicillin (AMOXIL) 500 MG tablet; Take 2 tablets (1,000 mg) by mouth 3 times daily for 7 days    SOB (shortness of breath)  -     Symptomatic COVID-19 Virus (Coronavirus) by PCR; Future  -     azithromycin (ZITHROMAX) 250 MG tablet; Take 2 tablets (500 mg) by mouth daily for 1 day, THEN 1 tablet (250 mg) daily for 4 days.  -     amoxicillin (AMOXIL) 500 MG tablet; Take 2 tablets (1,000 mg) by mouth 3 times daily for 7 days    Tobacco abuse  Increases concern for other etiologies, xray 1 mo    Cover for bronchitis v walking pneumonia but suspicious for covid       Patient Instructions   Cover with antibiotics for bronchitis v walking pneumonia but suspicious for covid    Given smoking history, suggest being seen for chest xray in a month, regardless of if symptoms completely go away  Discuss flu and pneumonia vaccines at that time, and possibly work on quitting smoking    Covid testing -  You will be called to schedule the covid test, or you can call [phone] to schedule.  Otherwise other testing locations outside [place] can be found at [email or web address]    Discharge Instructions for COVID-19 Patients  You have--or may have--COVID-19. Please follow the instructions listed below.   If you have a weakened immune system, discuss with your doctor any other actions you need to take.  How can I protect others?  If you have symptoms (fever, cough, body aches or trouble breathing):    Stay home and away from others (self-isolate) until:  ? At least 10 days have passed since your symptoms started, And   ? You've had no fever--and no medicine that reduces fever--for 1 full day (24 hours), And    ? Your other symptoms have resolved (gotten better).  If you don't show symptoms, but testing  "showed that you have COVID-19:    Stay home and away from others (self-isolate). Follow the tips under \"How do I self-isolate?\" below for 10 days (20 days if you have a weak immune system).    You don't need to be retested for COVID-19 before going back to school or work. As long as you're fever-free and feeling better, you can go back to school, work and other activities after waiting the 10 or 20 days.   How do I self-isolate?    Stay in your own room, even for meals. Use your own bathroom if you can.    Stay away from others in your home. No hugging, kissing or shaking hands. No visitors.    Don't go to work, school or anywhere else.    Clean \"high touch\" surfaces often (doorknobs, counters, handles). Use household cleaning spray or wipes. You'll find a full list of  on the EPA website: www.epa.gov/pesticide-registration/list-n-disinfectants-use-against-sars-cov-2.    Cover your mouth and nose with a mask or other face covering to avoid spreading germs.    Wash your hands and face often. Use soap and water.    Caregivers in these groups are at risk for severe illness due to COVID-19:  ? People 65 years and older  ? People who live in a nursing home or long-term care facility  ? People with chronic disease (lung, heart, cancer, diabetes, kidney, liver, immunologic)  ? People who have a weakened immune system, including those who:    Are in cancer treatment    Take medicine that weakens the immune system, such as corticosteroids    Had a bone marrow or organ transplant    Have an immune deficiency    Have poorly controlled HIV or AIDS    Are obese (body mass index of 40 or higher)    Smoke regularly    Caregivers should wear gloves while washing dishes, handling laundry and cleaning bedrooms and bathrooms.    Use caution when washing and drying laundry: Don't shake dirty laundry and use the warmest water setting that you can.    For more tips on managing your health at home, go to " www.cdc.gov/coronavirus/2019-ncov/downloads/10Things.pdf.  How can I take care of myself at home?  1. Get lots of rest. Drink extra fluids (unless a doctor has told you not to).    2. Take Tylenol (acetaminophen) for fever or pain. If you have liver or kidney problems, ask your family doctor if it's okay to take Tylenol.     Adults can take either:  ? 650 mg (two 325 mg pills) every 4 to 6 hours, or   ? 1,000 mg (two 500 mg pills) every 8 hours as needed.  ? Note: Don't take more than 3,000 mg in one day. Acetaminophen is found in many medicines (both prescribed and over-the-counter medicines). Read all labels to be sure you don't take too much.   For children, check the Tylenol bottle for the right dose. The dose is based on the child's age or weight.  3. If you have other health problems (like cancer, heart failure, an organ transplant or severe kidney disease): Call your specialty clinic if you don't feel better in the next 2 days.    4. Know when to call 911. Emergency warning signs include:  ? Trouble breathing or shortness of breath  ? Pain or pressure in the chest that doesn't go away  ? Feeling confused like you haven't felt before, or not being able to wake up  ? Bluish-colored lips or face    5. Your doctor may have prescribed a blood thinner medicine. Follow their instructions.  Where can I get more information?    [facility] - About COVID-19: [email or web address]    CDC - What to Do If You're Sick: www.cdc.gov/coronavirus/2019-ncov/about/steps-when-sick.html    CDC - Ending Home Isolation: www.cdc.gov/coronavirus/2019-ncov/hcp/disposition-in-home-patients.html    CDC - Caring for Someone: www.cdc.gov/coronavirus/2019-ncov/if-you-are-sick/care-for-someone.html    [facility] - Interim Guidance for Hospital Discharge to Home: www.health.[facility].[place]/diseases/coronavirus/hcp/hospdischarge.pdf    [facility] clinical trials (COVID-19 research studies):  clinicalaffairs.[facility]-clinical-trials    Below are the COVID-19 hotlines at the [place] Department of Health ([facility]). Interpreters are available.  ? For health questions: Call 420-036-1493 or 1-685.409.6289 (7 a.m. to 7 p.m.)  ? For questions about schools and childcare: Call 443-461-2416 or 1-924.710.9956 (7 a.m. to 7 p.m.)    For informational purposes only. Not to replace the advice of your health care provider. Clinically reviewed by the Infection Prevention Team. Copyright   2020 [facility]. All rights reserved. [employer] 840653 - REV 08/04/20.          Return in about 1 month (around 11/30/2020).    Kaylen Quintero PA-C  [facility]      Video-Visit Details    Type of service:  Video Visit    Video End Time:9:50    Originating Location (pt. Location): Home    Distant Location (provider location):  [facility]     Platform used for Video Visit: [employer]

## 2020-11-03 DIAGNOSIS — R06.02 SOB (SHORTNESS OF BREATH): ICD-10-CM

## 2020-11-03 DIAGNOSIS — Z20.822 ENCOUNTER FOR LABORATORY TESTING FOR COVID-19 VIRUS: ICD-10-CM

## 2020-11-03 DIAGNOSIS — R05.9 COUGH: ICD-10-CM

## 2020-11-03 PROCEDURE — U0003 INFECTIOUS AGENT DETECTION BY NUCLEIC ACID (DNA OR RNA); SEVERE ACUTE RESPIRATORY SYNDROME CORONAVIRUS 2 (SARS-COV-2) (CORONAVIRUS DISEASE [COVID-19]), AMPLIFIED PROBE TECHNIQUE, MAKING USE OF HIGH THROUGHPUT TECHNOLOGIES AS DESCRIBED BY CMS-2020-01-R: HCPCS | Performed by: PHYSICIAN ASSISTANT

## 2020-11-04 LAB
SARS-COV-2 RNA SPEC QL NAA+PROBE: NOT DETECTED
SPECIMEN SOURCE: NORMAL

## 2020-11-05 NOTE — RESULT ENCOUNTER NOTE
Please CALL - Covid test was negative, suggesting he does not have covid.  I had prescribed medications as well.  Are symptoms starting to improve?

## 2020-11-24 ENCOUNTER — VIRTUAL VISIT (OUTPATIENT)
Dept: FAMILY MEDICINE | Facility: CLINIC | Age: 50
End: 2020-11-24
Payer: COMMERCIAL

## 2020-11-24 DIAGNOSIS — J20.9 ACUTE BRONCHITIS WITH SYMPTOMS > 10 DAYS: Primary | ICD-10-CM

## 2020-11-24 PROCEDURE — 99213 OFFICE O/P EST LOW 20 MIN: CPT | Mod: 95 | Performed by: PHYSICIAN ASSISTANT

## 2020-11-24 RX ORDER — BENZONATATE 200 MG/1
200 CAPSULE ORAL 3 TIMES DAILY PRN
Qty: 30 CAPSULE | Refills: 0 | Status: SHIPPED | OUTPATIENT
Start: 2020-11-24

## 2020-11-24 RX ORDER — AZITHROMYCIN 250 MG/1
TABLET, FILM COATED ORAL
Qty: 6 TABLET | Refills: 0 | Status: SHIPPED | OUTPATIENT
Start: 2020-11-24 | End: 2020-11-29

## 2020-11-24 RX ORDER — ALBUTEROL SULFATE 90 UG/1
2 AEROSOL, METERED RESPIRATORY (INHALATION) EVERY 4 HOURS PRN
Qty: 1 INHALER | Refills: 0 | Status: SHIPPED | OUTPATIENT
Start: 2020-11-24

## 2020-11-24 NOTE — PROGRESS NOTES
"Carmelo Henderson is a 50 year old male who is being evaluated via a billable telephone visit.      The patient has been notified of following:     \"This telephone visit will be conducted via a call between you and your physician/provider. We have found that certain health care needs can be provided without the need for a physical exam.  This service lets us provide the care you need with a short phone conversation.  If a prescription is necessary we can send it directly to your pharmacy.  If lab work is needed we can place an order for that and you can then stop by our lab to have the test done at a later time.    Telephone visits are billed at different rates depending on your insurance coverage. During this emergency period, for some insurers they may be billed the same as an in-person visit.  Please reach out to your insurance provider with any questions.    If during the course of the call the physician/provider feels a telephone visit is not appropriate, you will not be charged for this service.\"    Patient has given verbal consent for Telephone visit?  Yes    What phone number would you like to be contacted at? [phone]    How would you like to obtain your AVS? Mail a copy    Subjective     Carmelo Henderson is a 50 year old male who presents via phone visit today for the following health issues:    HPI  Acute Illness  Acute illness concerns: Cough   Onset/Duration: 1.5 months. Was tested for COVID on 11/03/2020  Symptoms:  Fever: no  Chills/Sweats: no  Headache (location?): no  Sinus Pressure: no  Conjunctivitis:  no  Ear Pain: no  Rhinorrhea: YES- on occasion   Congestion: some occasionally   Sore Throat: no  Cough: YES - Dry cough, chest pain. Worst when he lays down  Wheeze: no  Decreased Appetite: YES  Nausea: no  Vomiting: no  Diarrhea: no  Dysuria/Freq.: no  Dysuria or Hematuria: no  Fatigue/Achiness: YES  Sick/Strep Exposure: no  Therapies tried and outcome: Nyquil     Review of Systems " "  Constitutional, HEENT, cardiovascular, pulmonary, gi and gu systems are negative, except as otherwise noted.     Objective      Vitals:  No vitals were obtained today due to virtual visit.    General: healthy, alert and no distress  PSYCH: Alert and oriented times 3; coherent speech, normal   rate and volume, able to articulate logical thoughts, able   to abstract reason, no tangential thoughts, no hallucinations   or delusions  His affect is normal  RESP: No cough, no audible wheezing, able to talk in full sentences  Remainder of exam unable to be completed due to telephone visits      Assessment & Plan   Acute bronchitis with symptoms > 10 days  Patient with URI symptoms several weeks ago now with a persistent unchanged cough for the last 6 week. Suspect this to be a post viral bronchitis.  Will treat with albuterol, Tessalon Perles, and Azithromycin. Return to clinic in 2 to 4 weeks if their symptoms are unchanged or sooner if there is any new, worsening, or concerning symptoms.  - azithromycin (ZITHROMAX) 250 MG tablet; Take 2 tablets (500 mg) by mouth daily for 1 day, THEN 1 tablet (250 mg) daily for 4 days.  - albuterol (PROAIR HFA/PROVENTIL HFA/VENTOLIN HFA) 108 (90 Base) MCG/ACT inhaler; Inhale 2 puffs into the lungs every 4 hours as needed for shortness of breath / dyspnea or wheezing  - benzonatate (TESSALON) 200 MG capsule; Take 1 capsule (200 mg) by mouth 3 times daily as needed for cough     Tobacco Cessation:   reports that he has been smoking. He has never used smokeless tobacco.  Tobacco Cessation Action Plan: Information offered: Patient not interested at this time      BMI:   Estimated body mass index is 34.7 kg/m  as calculated from the following:    Height as of 9/21/20: 1.676 m (5' 6\").    Weight as of 9/21/20: 97.5 kg (215 lb).     Return in about 2 weeks (around 12/8/2020), or if symptoms worsen or fail to improve, for In-Clinic Visit.    Eric Rodriguez PA-C  [facility] " [place]    Phone call duration:  9 minutes

## 2020-11-24 NOTE — PATIENT INSTRUCTIONS

## 2023-08-24 NOTE — TELEPHONE ENCOUNTER
Heidy- do you prefer phone of video visit for this?  TOHSA Kirk RN     I called and spoke with patient. He has follow up on 9/5/23.